# Patient Record
Sex: FEMALE | Race: OTHER | HISPANIC OR LATINO | Employment: FULL TIME | URBAN - METROPOLITAN AREA
[De-identification: names, ages, dates, MRNs, and addresses within clinical notes are randomized per-mention and may not be internally consistent; named-entity substitution may affect disease eponyms.]

---

## 2024-08-14 ENCOUNTER — OFFICE VISIT (OUTPATIENT)
Dept: UROLOGY | Facility: CLINIC | Age: 49
End: 2024-08-14
Payer: COMMERCIAL

## 2024-08-14 VITALS
HEIGHT: 72 IN | WEIGHT: 150 LBS | DIASTOLIC BLOOD PRESSURE: 72 MMHG | HEART RATE: 93 BPM | BODY MASS INDEX: 20.32 KG/M2 | SYSTOLIC BLOOD PRESSURE: 104 MMHG

## 2024-08-14 DIAGNOSIS — N30.01 ACUTE CYSTITIS WITH HEMATURIA: ICD-10-CM

## 2024-08-14 DIAGNOSIS — N20.0 NEPHROLITHIASIS: Primary | ICD-10-CM

## 2024-08-14 DIAGNOSIS — R31.9 HEMATURIA, UNSPECIFIED TYPE: ICD-10-CM

## 2024-08-14 LAB
POC APPEARANCE, URINE: CLEAR
POC BILIRUBIN, URINE: NEGATIVE
POC BLOOD, URINE: ABNORMAL
POC COLOR, URINE: YELLOW
POC GLUCOSE, URINE: NEGATIVE MG/DL
POC KETONES, URINE: NEGATIVE MG/DL
POC LEUKOCYTES, URINE: NEGATIVE
POC NITRITE,URINE: NEGATIVE
POC PH, URINE: 5.5 PH
POC PROTEIN, URINE: NEGATIVE MG/DL
POC SPECIFIC GRAVITY, URINE: 1.01
POC UROBILINOGEN, URINE: 0.2 EU/DL

## 2024-08-14 RX ORDER — ALBUTEROL SULFATE 0.83 MG/ML
SOLUTION RESPIRATORY (INHALATION) EVERY 6 HOURS
COMMUNITY

## 2024-08-14 RX ORDER — GABAPENTIN 400 MG/1
1 CAPSULE ORAL EVERY 24 HOURS
COMMUNITY

## 2024-08-14 RX ORDER — ROSUVASTATIN CALCIUM 10 MG/1
1 TABLET, FILM COATED ORAL DAILY
COMMUNITY

## 2024-08-14 RX ORDER — BUSPIRONE HYDROCHLORIDE 15 MG/1
15 TABLET ORAL 2 TIMES DAILY
COMMUNITY
Start: 2023-04-08

## 2024-08-14 RX ORDER — DOXYCYCLINE HYCLATE 100 MG
1 TABLET ORAL
COMMUNITY
Start: 2024-03-01

## 2024-08-14 RX ORDER — BUDESONIDE AND FORMOTEROL FUMARATE DIHYDRATE 160; 4.5 UG/1; UG/1
AEROSOL RESPIRATORY (INHALATION) EVERY 12 HOURS
COMMUNITY

## 2024-08-14 RX ORDER — OMEPRAZOLE 40 MG/1
1 CAPSULE, DELAYED RELEASE ORAL EVERY 24 HOURS
COMMUNITY

## 2024-08-14 RX ORDER — RIMEGEPANT SULFATE 75 MG/75MG
TABLET, ORALLY DISINTEGRATING ORAL
COMMUNITY

## 2024-08-14 RX ORDER — ONDANSETRON 4 MG/1
TABLET, ORALLY DISINTEGRATING ORAL
COMMUNITY
Start: 2024-07-07

## 2024-08-14 RX ORDER — ERGOCALCIFEROL 1.25 MG/1
1 CAPSULE ORAL
COMMUNITY

## 2024-08-14 RX ORDER — CYANOCOBALAMIN 1000 UG/ML
INJECTION, SOLUTION INTRAMUSCULAR; SUBCUTANEOUS
COMMUNITY

## 2024-08-14 RX ORDER — FLUOXETINE HYDROCHLORIDE 40 MG/1
1 CAPSULE ORAL EVERY 24 HOURS
COMMUNITY

## 2024-08-14 RX ORDER — NORTRIPTYLINE HYDROCHLORIDE 25 MG/1
CAPSULE ORAL EVERY 24 HOURS
COMMUNITY

## 2024-08-14 RX ORDER — TOPIRAMATE 100 MG/1
TABLET, FILM COATED ORAL EVERY 24 HOURS
COMMUNITY

## 2024-08-14 RX ORDER — EMPAGLIFLOZIN 25 MG/1
1 TABLET, FILM COATED ORAL DAILY
COMMUNITY

## 2024-08-14 RX ORDER — TIRZEPATIDE 10 MG/.5ML
INJECTION, SOLUTION SUBCUTANEOUS
COMMUNITY

## 2024-08-14 NOTE — PROGRESS NOTES
"      Nephrolithiasis   Chief Complaint    Nephrolithiasis        Referring physician: No ref. provider found     SUBJECTIVE:  HPI:   Briseida Cole is a 49 y.o. female with a history of T2DM, degenerative disc disease who presents with complaints of flank pain and gross hematuria.  Reviewed chart, imaging not available.    July started to have right flank pain intermittently and dysuria, gross hematuria.  Went to [facility] ED.  Pyuria on UA, Ucx resulted pan-sensitive E coli.  CT showed nonobstructing nephrolithiasis.  Unfortunately can't access images, report comments on 5mm RLP stone, punctate LLP stones.  Discharge with PO cipro, which she took and symptoms improved.  Pain has recurred however.    Intermittent flank pain she states past 6 years which she attributes to kidney stones.  Also has DDD, which causes low back pain which is distinct from the flank pain she complains of today.  Has never had surgery for stones.  Has passed stones before.    Since recent dx of T2DM has lost 40lbs intentionally.    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     Past medical, surgical, family and social history in the chart was reviewed and is accurate including any additions to what is in this HPI.    ROS:  14-point review of systems negative except as noted above.    OBJECTIVE:  Visit Vitals  /72   Pulse 93     Physical Exam   General:  No acute distress  HEENT:  EOMI  CV:  Regular rate  Pulm:  Nonlabored respirations  Abd:  Soft, non-distended  :  No suprapubic or CVA tenderness  MSK:  No contractures  Neuro:  Motor intact  Psych:  Appropriate affect    Labs:  Lab Results   Component Value Date    HGBA1C 5.5 02/01/2024     No results found for: \"KPSAT\", \"KPSAP\"  No results found for: \"URINECULTURE\"     No results found for: \"PSA\"    IMAGING:  All imaging discussed " in HPI was independently reviewed.    ASSESSMENT:  49F hx T2DM, DDD with acute on chronic renal colic associated with longstanding nonobstructive nephrolithiasis, intermittent gross hematuria.  Recent acute cystitis pan-sens E. Coli UTI 7/7 treated with cipro.  Still having bl (R>L) flank pain.    Problem List Items Addressed This Visit    None  Visit Diagnoses       Nephrolithiasis    -  Primary    Hematuria, unspecified type        Relevant Orders    POCT UA Automated manually resulted (Completed)             PLAN:  Nonobstructing nephrolithiasis with renal colic (acute on chronic)  Obtain outside 7/7/24 CT A/P, prior 24h urines, serum labs  Discussed elective surgery focusing on ureteroscopy with potential success of pain relief at 50/50.  Discussed possibility of repeat procedures, need for ureteral stent.  Gross hematuria  Need cystoscopy, CTU and cytology to exclude malignancy  Will await decision re stone procedure - if ureteroscopy will not need separate procedure  Acute cystitis with hematuria  Resolved.  Increased risk of issues with nephrolithiasis.    Patient will consider options.    Follow up 1 month - review CT, discuss outpatient cysto vs elective ureteroscopy    Jose Marti MD

## 2024-09-05 ENCOUNTER — APPOINTMENT (OUTPATIENT)
Dept: UROLOGY | Facility: HOSPITAL | Age: 49
End: 2024-09-05
Payer: COMMERCIAL

## 2024-09-09 ENCOUNTER — APPOINTMENT (OUTPATIENT)
Dept: UROLOGY | Facility: CLINIC | Age: 49
End: 2024-09-09
Payer: COMMERCIAL

## 2024-09-11 ENCOUNTER — APPOINTMENT (OUTPATIENT)
Dept: UROLOGY | Facility: CLINIC | Age: 49
End: 2024-09-11
Payer: COMMERCIAL

## 2024-09-11 VITALS
DIASTOLIC BLOOD PRESSURE: 72 MMHG | BODY MASS INDEX: 28.47 KG/M2 | HEIGHT: 60 IN | HEART RATE: 89 BPM | SYSTOLIC BLOOD PRESSURE: 108 MMHG | WEIGHT: 145 LBS

## 2024-09-11 DIAGNOSIS — N20.0 NEPHROLITHIASIS: ICD-10-CM

## 2024-09-11 DIAGNOSIS — R31.9 HEMATURIA, UNSPECIFIED TYPE: Primary | ICD-10-CM

## 2024-09-11 LAB
BACTERIA #/AREA URNS AUTO: ABNORMAL /HPF
POC APPEARANCE, URINE: CLEAR
POC BILIRUBIN, URINE: NEGATIVE
POC BLOOD, URINE: NEGATIVE
POC COLOR, URINE: YELLOW
POC GLUCOSE, URINE: NEGATIVE MG/DL
POC KETONES, URINE: NEGATIVE MG/DL
POC LEUKOCYTES, URINE: ABNORMAL
POC NITRITE,URINE: NEGATIVE
POC PH, URINE: 6 PH
POC PROTEIN, URINE: NEGATIVE MG/DL
POC SPECIFIC GRAVITY, URINE: 1.01
POC UROBILINOGEN, URINE: 0.2 EU/DL
RBC #/AREA URNS AUTO: ABNORMAL /HPF
SQUAMOUS #/AREA URNS AUTO: ABNORMAL /HPF
WBC #/AREA URNS AUTO: ABNORMAL /HPF

## 2024-09-11 PROCEDURE — 3008F BODY MASS INDEX DOCD: CPT | Performed by: STUDENT IN AN ORGANIZED HEALTH CARE EDUCATION/TRAINING PROGRAM

## 2024-09-11 PROCEDURE — 99213 OFFICE O/P EST LOW 20 MIN: CPT | Performed by: STUDENT IN AN ORGANIZED HEALTH CARE EDUCATION/TRAINING PROGRAM

## 2024-09-11 PROCEDURE — 1036F TOBACCO NON-USER: CPT | Performed by: STUDENT IN AN ORGANIZED HEALTH CARE EDUCATION/TRAINING PROGRAM

## 2024-09-11 PROCEDURE — 81003 URINALYSIS AUTO W/O SCOPE: CPT | Performed by: STUDENT IN AN ORGANIZED HEALTH CARE EDUCATION/TRAINING PROGRAM

## 2024-09-11 NOTE — PROGRESS NOTES
Follow-up (1 month)   Chief Complaint    Follow-up        Referring physician: No ref. provider found     SUBJECTIVE:  HPI:   Briseida Cole is a 49 y.o. female with a history of T2DM, degenerative disc disease who presents with complaints of flank pain and gross hematuria.  Reviewed chart and imaging.    Since last visit had ED visit 8/23 at [facility] for back pain.  Was bilateral lower back, xray mild degenerative changes on read, dcd home.    Her father was just dx with CCRCC, has upcoming urology appt at [facility].  No hematuria since last visit but confirms history of gross hematuria in July on wiping and in her 20s on microscopy.    Was able to see CT images from 7/7 noncon AP today.  Independently reviewed.  5mm nonobstructing RLP stone, no hydro.  LLP some hyperdensity but no clear stones.  No apparent masses though limited dt noncon.  Reviewed images with patient.      Last visit:  July started to have right flank pain intermittently and dysuria, gross hematuria.  Went to [facility] ED.  Pyuria on UA, Ucx resulted pan-sensitive E coli.  CT showed nonobstructing nephrolithiasis.  Unfortunately can't access images, report comments on 5mm RLP stone, punctate LLP stones.  Discharge with PO cipro, which she took and symptoms improved.  Pain has recurred however.    Intermittent flank pain she states past 6 years which she attributes to kidney stones.  Also has DDD, which causes low back pain which is distinct from the flank pain she complains of today.  Has never had surgery for stones.  Has passed stones before.    Since recent dx of T2DM has lost 40lbs intentionally.    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    ROS:  14-point review of systems negative except as  "noted above.    OBJECTIVE:  Visit Vitals  /72   Pulse 89     Body mass index is 28.8 kg/m².  Physical Exam   General:  No acute distress  HEENT:  EOMI  CV:  Regular rate  Pulm:  Nonlabored respirations  Abd:  Soft, non-distended  :  No suprapubic or CVA tenderness  MSK:  No contractures  Neuro:  Motor intact  Psych:  Appropriate affect    Labs:  Lab Results   Component Value Date    HGBA1C 5.5 02/01/2024     No results found for: \"URINECULTURE\"   No results found for: \"PSA\"    IMAGING:  All imaging discussed in HPI was independently reviewed.    ASSESSMENT:  Gross hematuria  Nonobstructing nephrolithiasis - 5mm RLP    PLAN:  Discussed gross hematuria workup, need for cysto, CTU and cytology.  Note new famhx RCC and remote 2py smoking hx.  Discussed options at length re observation or elective stone tx and likelihood of not improving symptoms, role as preventive treatment.  Discussed ESWL (though STS about 10.5cm) and URS.  Patient would prefer to think about this and decide later which is quite reasonable.  Follow-up 1 month with cysto after CTU, cytology.    All questions were answered to the patient’s satisfaction.  Patient agrees with the plan and wishes to proceed.    Jose Marti MD    Problem List Items Addressed This Visit    None  Visit Diagnoses       Nephrolithiasis        Relevant Orders    POCT UA Automated manually resulted (Completed)    Hematuria, unspecified type        Relevant Orders    POCT UA Automated manually resulted (Completed)             "

## 2024-09-12 LAB
LABORATORY COMMENT REPORT: NORMAL
LABORATORY COMMENT REPORT: NORMAL
PATH REPORT.FINAL DX SPEC: NORMAL
PATH REPORT.GROSS SPEC: NORMAL
PATH REPORT.RELEVANT HX SPEC: NORMAL
PATH REPORT.TOTAL CANCER: NORMAL

## 2024-09-19 ENCOUNTER — TELEPHONE (OUTPATIENT)
Facility: CLINIC | Age: 49
End: 2024-09-19
Payer: COMMERCIAL

## 2024-09-19 NOTE — TELEPHONE ENCOUNTER
Left message apologizing I must move her scheduled cystoscopy from oct 3 to oct 1. Direct number of [phone] left for pt to call if need to reschedule

## 2024-10-01 ENCOUNTER — APPOINTMENT (OUTPATIENT)
Facility: CLINIC | Age: 49
End: 2024-10-01
Payer: COMMERCIAL

## 2024-10-01 VITALS — HEART RATE: 103 BPM | TEMPERATURE: 96.4 F | SYSTOLIC BLOOD PRESSURE: 92 MMHG | DIASTOLIC BLOOD PRESSURE: 70 MMHG

## 2024-10-01 DIAGNOSIS — N20.0 NEPHROLITHIASIS: ICD-10-CM

## 2024-10-01 DIAGNOSIS — R31.9 HEMATURIA, UNSPECIFIED TYPE: Primary | ICD-10-CM

## 2024-10-01 LAB
POC APPEARANCE, URINE: ABNORMAL
POC BILIRUBIN, URINE: NEGATIVE
POC BLOOD, URINE: ABNORMAL
POC COLOR, URINE: YELLOW
POC GLUCOSE, URINE: NEGATIVE MG/DL
POC KETONES, URINE: NEGATIVE MG/DL
POC LEUKOCYTES, URINE: ABNORMAL
POC NITRITE,URINE: NEGATIVE
POC PH, URINE: 6 PH
POC PROTEIN, URINE: NEGATIVE MG/DL
POC SPECIFIC GRAVITY, URINE: 1.01
POC UROBILINOGEN, URINE: 0.2 EU/DL

## 2024-10-01 PROCEDURE — 81003 URINALYSIS AUTO W/O SCOPE: CPT | Performed by: STUDENT IN AN ORGANIZED HEALTH CARE EDUCATION/TRAINING PROGRAM

## 2024-10-01 PROCEDURE — 52000 CYSTOURETHROSCOPY: CPT | Performed by: STUDENT IN AN ORGANIZED HEALTH CARE EDUCATION/TRAINING PROGRAM

## 2024-10-01 NOTE — PROGRESS NOTES
PROCEDURE NOTE:    PREOPERATIVE DIAGNOSIS:  Gross hematuria    POSTOPERATIVE DIAGNOSIS:  Same    OPERATION:  Flexible Cystourethroscopy    SURGEON:  Jose Marti MD    ANESTHESIA:  2% viscous lidocaine    COMPLICATIONS:  None    EBL: Minimal    INDICATIONS:  Ms. WILLIS GOULD is a 49 y.o. patient with a history of gross hematuria who presents today for cystoscopy.     The indications, risks and benefits of this procedure were discussed with the patient, and consent was obtained prior to the procedure.    UA reviewed:  neg nit, trace LE, trace blood    CTU performed 9/27/24 independently reviewed.  Shows stable 5mm RLP nonobstructing stone, RLP simple renal cyst, no masses, no hydronephrosis, no upper tract filling defects on delays.  Radiology report pending.  Cytology neg    Her father had his nephrectomy at [facility].  Sounds like large mass, open incision, bowel injury repaired.  He is recovering well.    Still having intermittent dysuria associated with some back pain but tolerable at present.    PROCEDURE:  The patient was brought into the procedure suite and informed consent was reviewed and confirmed. Vital signs were obtained prior to the procedure: BP 92/70   Pulse 103   Temp 35.8 °C (96.4 °F) (Temporal) .     The patient was escorted onto the stretcher, placed supine and froglegged, prepped with betadine and draped in the usual fashion.  Intraurethral 2% viscous lidocaine jelly was used for local analgesia.  A 16 [place] flexible cystourethroscope was inserted per urethra.       Vulva absent of lesions.  Urethral meatus had some fullness at the lower margin but nothing concerning for prolapse or caruncle.  Vulvar epithelium pink, intact.  The bladder was filled and careful pancystoscopy including retroflexion noted no stones, lesions or masses.  No trabeculations.  Bilateral orthotopic Uos.  The bladder was drained.  While removing the scope the urethra was inspected noting no strictures or lesions.   This concluded the procedure which the patient tolerated well.  Patient was cleaned and dried.    IMPRESSION:  Gross hematuria - no lesions or masses identified on complete workup with cysto, CTU, cytology.  Irritative lower urinary tract symptoms under control.  If worsening consider urogyn eval.    PLAN:  Follow up in 1 year or sooner as needed  Repeat UA with microscopy in 1 year

## 2024-10-03 ENCOUNTER — APPOINTMENT (OUTPATIENT)
Facility: CLINIC | Age: 49
End: 2024-10-03
Payer: COMMERCIAL

## 2024-11-08 ENCOUNTER — APPOINTMENT (OUTPATIENT)
Dept: ENDOCRINOLOGY | Facility: CLINIC | Age: 49
End: 2024-11-08
Payer: COMMERCIAL